# Patient Record
Sex: MALE | Race: WHITE | NOT HISPANIC OR LATINO | Employment: FULL TIME | URBAN - METROPOLITAN AREA
[De-identification: names, ages, dates, MRNs, and addresses within clinical notes are randomized per-mention and may not be internally consistent; named-entity substitution may affect disease eponyms.]

---

## 2021-06-08 PROBLEM — S46.012A TRAUMATIC COMPLETE TEAR OF LEFT ROTATOR CUFF: Status: ACTIVE | Noted: 2021-06-08

## 2021-07-14 PROBLEM — M75.112 INCOMPLETE TEAR OF LEFT ROTATOR CUFF: Status: ACTIVE | Noted: 2021-07-14

## 2021-07-29 PROBLEM — M75.112 INCOMPLETE TEAR OF LEFT ROTATOR CUFF: Status: RESOLVED | Noted: 2021-07-14 | Resolved: 2021-07-29

## 2024-04-25 PROBLEM — Z98.1 HISTORY OF LUMBAR FUSION: Status: ACTIVE | Noted: 2024-04-25

## 2024-04-25 PROBLEM — M54.16 LUMBAR RADICULOPATHY: Status: ACTIVE | Noted: 2024-04-25

## 2024-04-25 PROBLEM — F41.9 ANXIETY: Status: ACTIVE | Noted: 2024-04-25

## 2024-04-25 PROBLEM — M96.0 PSEUDARTHROSIS FOLLOWING SPINAL FUSION: Status: ACTIVE | Noted: 2024-04-25

## 2024-11-22 DIAGNOSIS — R31.21 ASYMPTOMATIC MICROSCOPIC HEMATURIA: Primary | ICD-10-CM

## 2024-12-03 ENCOUNTER — OFFICE VISIT (OUTPATIENT)
Dept: UROLOGY | Facility: CLINIC | Age: 54
End: 2024-12-03
Payer: MEDICAID

## 2024-12-03 VITALS — WEIGHT: 207.38 LBS | BODY MASS INDEX: 26.62 KG/M2 | HEIGHT: 74 IN

## 2024-12-03 DIAGNOSIS — R31.21 ASYMPTOMATIC MICROSCOPIC HEMATURIA: ICD-10-CM

## 2024-12-03 LAB
BILIRUBIN, UA POC OHS: NEGATIVE
BLOOD, UA POC OHS: NEGATIVE
CLARITY, UA POC OHS: CLEAR
COLOR, UA POC OHS: YELLOW
GLUCOSE, UA POC OHS: NEGATIVE
KETONES, UA POC OHS: NEGATIVE
LEUKOCYTES, UA POC OHS: NEGATIVE
NITRITE, UA POC OHS: NEGATIVE
PH, UA POC OHS: 6
PROTEIN, UA POC OHS: NEGATIVE
SPECIFIC GRAVITY, UA POC OHS: 1.01
UROBILINOGEN, UA POC OHS: 0.2

## 2024-12-03 PROCEDURE — 99204 OFFICE O/P NEW MOD 45 MIN: CPT | Mod: S$PBB,,, | Performed by: NURSE PRACTITIONER

## 2024-12-03 PROCEDURE — 3008F BODY MASS INDEX DOCD: CPT | Mod: CPTII,,, | Performed by: NURSE PRACTITIONER

## 2024-12-03 PROCEDURE — 1160F RVW MEDS BY RX/DR IN RCRD: CPT | Mod: CPTII,,, | Performed by: NURSE PRACTITIONER

## 2024-12-03 PROCEDURE — 1159F MED LIST DOCD IN RCRD: CPT | Mod: CPTII,,, | Performed by: NURSE PRACTITIONER

## 2024-12-03 RX ORDER — PREGABALIN 100 MG/1
100 CAPSULE ORAL
COMMUNITY

## 2024-12-03 RX ORDER — PANTOPRAZOLE SODIUM 40 MG/1
40 TABLET, DELAYED RELEASE ORAL EVERY MORNING
COMMUNITY

## 2024-12-03 NOTE — PROGRESS NOTES
Subjective:       Patient ID: Villa Gastelum is a 54 y.o. male.    Chief Complaint: No chief complaint on file.      HPI: 54-year-old male, new to [facility], referred for blood in his urine.    Patient has had multiple urinalysis showing blood in the urine.  He denies seeing any blood in urine.  Denies any unexpected weight loss.  Denies any pain or burning urination.  Denies any odor to the urine.  Denies any fever or body aches.  Denies any unexpected weight loss.    Patient is disabled.  Patient is a former smoker.  He quit about 3 years ago.  He smoked for 35 years.  He smoked half a pack a day.    No other urinary complaints at this time.         Past Medical History:   Past Medical History:   Diagnosis Date    Anxiety disorder, unspecified     Rheumatoid arthritis, unspecified     Thrombosed hemorrhoids        Past Surgical Historical:   Past Surgical History:   Procedure Laterality Date    ANKLE FRACTURE SURGERY  2001    LUMBAR LAMINECTOMY      SHOULDER ARTHROSCOPY W/ ROTATOR CUFF REPAIR Left 07/28/2021    Dr. Flynn    SPINAL FUSION  09/06/2023    spinal fusion revision  06/12/2024        Medications:   Medication List with Changes/Refills   Current Medications    ACETAMINOPHEN (TYLENOL) 500 MG TABLET    Take 500 mg by mouth every 6 (six) hours as needed for Pain.    CYCLOBENZAPRINE (FEXMID) 7.5 MG TAB    Take 1 tablet (7.5 mg total) by mouth 3 (three) times daily as needed (muscle spasms).    HYDROCODONE-ACETAMINOPHEN (NORCO)  MG PER TABLET    Take 1 tablet by mouth every 6 (six) hours as needed for Pain.    IBUPROFEN (ADVIL,MOTRIN) 800 MG TABLET    Take 800 mg by mouth 3 (three) times daily.    PANTOPRAZOLE (PROTONIX) 40 MG TABLET    Take 40 mg by mouth every morning.    PREGABALIN (LYRICA) 100 MG CAPSULE    Take 100 mg by mouth.    PREGABALIN (LYRICA) 75 MG CAPSULE    Take 1 capsule (75 mg total) by mouth 2 (two) times daily. for 30 doses        Past Social History:   Social History      Socioeconomic History    Marital status:    Tobacco Use    Smoking status: Former     Types: Cigarettes    Smokeless tobacco: Never   Substance and Sexual Activity    Alcohol use: Yes    Drug use: Never       Allergies:   Review of patient's allergies indicates:   Allergen Reactions    Oxycodone-acetaminophen      Severe headache        Family History: No family history on file.     Review of Systems:  Review of Systems   Constitutional:  Negative for activity change and appetite change.   HENT:  Negative for congestion and dental problem.    Eyes:  Negative for visual disturbance.   Respiratory:  Negative for chest tightness and shortness of breath.    Cardiovascular:  Negative for chest pain.   Gastrointestinal:  Negative for abdominal distention and abdominal pain.   Genitourinary:  Positive for hematuria. Negative for decreased urine volume, difficulty urinating, dysuria, enuresis, flank pain, frequency, genital sores, penile discharge, penile pain, penile swelling, scrotal swelling, testicular pain and urgency.   Musculoskeletal:  Negative for back pain and neck pain.   Skin:  Negative for color change.   Neurological:  Negative for dizziness.   Hematological:  Negative for adenopathy.   Psychiatric/Behavioral:  Negative for agitation, behavioral problems and confusion.        Physical Exam:  Physical Exam  Vitals and nursing note reviewed.   Constitutional:       Appearance: He is well-developed.   HENT:      Head: Normocephalic.   Eyes:      Pupils: Pupils are equal, round, and reactive to light.   Cardiovascular:      Rate and Rhythm: Normal rate and regular rhythm.      Heart sounds: Normal heart sounds.   Pulmonary:      Effort: Pulmonary effort is normal.      Breath sounds: Normal breath sounds.   Abdominal:      General: Bowel sounds are normal.      Palpations: Abdomen is soft.   Musculoskeletal:         General: Normal range of motion.      Cervical back: Normal range of motion and neck  supple.   Skin:     General: Skin is warm and dry.   Neurological:      Mental Status: He is alert and oriented to person, place, and time.   Psychiatric:         Behavior: Behavior normal.       Urinalysis: Normal    Assessment/Plan:   Microscopic hematuria:  Urinalysis shows no blood today.  Denies any urinary complaints.    He was a former smoker.  Has a long history of smoking.  Discussed possible causes of blood in urine.  Discussed concerns of blood in his urine.  Schedule patient for CT urogram and cysto for further evaluation.    Follow-up to be arranged  Problem List Items Addressed This Visit    None  Visit Diagnoses       Asymptomatic microscopic hematuria        Relevant Orders    POCT Urinalysis(Instrument)    Cystoscopy    CT Urogram Abd Pelvis W WO
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
## 2024-12-09 ENCOUNTER — OFFICE VISIT (OUTPATIENT)
Dept: NEUROLOGY | Facility: CLINIC | Age: 54
End: 2024-12-09
Payer: MEDICAID

## 2024-12-09 DIAGNOSIS — F43.9 TRAUMA AND STRESSOR-RELATED DISORDER: ICD-10-CM

## 2024-12-09 DIAGNOSIS — F32.0 CURRENT MILD EPISODE OF MAJOR DEPRESSIVE DISORDER, UNSPECIFIED WHETHER RECURRENT: Primary | ICD-10-CM

## 2024-12-11 ENCOUNTER — TELEPHONE (OUTPATIENT)
Dept: UROLOGY | Facility: CLINIC | Age: 54
End: 2024-12-11
Payer: MEDICAID

## 2024-12-11 NOTE — TELEPHONE ENCOUNTER
Notified patient of results         ----- Message from Eugene Mendiola NP sent at 12/10/2024 12:42 PM CST -----  No renal masses noted.  Prostate is enlarged.  No other abnormalities.    Proceed with cysto.

## 2024-12-12 ENCOUNTER — PROCEDURE VISIT (OUTPATIENT)
Dept: UROLOGY | Facility: CLINIC | Age: 54
End: 2024-12-12
Payer: MEDICAID

## 2024-12-12 VITALS
OXYGEN SATURATION: 98 % | HEART RATE: 53 BPM | BODY MASS INDEX: 26.51 KG/M2 | DIASTOLIC BLOOD PRESSURE: 74 MMHG | SYSTOLIC BLOOD PRESSURE: 124 MMHG | WEIGHT: 206.5 LBS | RESPIRATION RATE: 14 BRPM

## 2024-12-12 DIAGNOSIS — R31.21 ASYMPTOMATIC MICROSCOPIC HEMATURIA: ICD-10-CM

## 2024-12-12 NOTE — PATIENT INSTRUCTIONS
Patient Education       Cystoscopy Discharge Instructions   About this topic   Your kidneys make urine. It is stored in your bladder. The urethra is a tube at the bottom of the bladder. Urine flows out of this tube. Sometimes, there is a blockage and urine is not able to leave the body.  A cystoscopy is a procedure that lets the doctor see the inside of your bladder and urethra. The doctor does it to:  Look for stones or tumors blocking the bladder and urethra  Look for changes or injury inside the bladder  Take a tissue sample from the inside of your bladder  Look for reasons for blood in the urine, pain with urination, or why you are passing urine often  Look for prostate problems     What care is needed at home?   Ask your doctor what you need to do when you go home. Make sure you ask questions if you do not understand what the doctor says. This way you will know what you need to do.  Take a warm bath or use a warm wet washcloth over the opening to the urethra. This will help to ease any pain. Do this as needed.  Drink 6 to 8 glasses of water a day and 3 to 4 glasses in the first few hours after the procedure to flush out your bladder and reduce irritation.  You may see some blood in your urine for a few days. This is normal.  Empty your bladder as soon as you feel the need to. Don't delay going to the bathroom. It stretches and weakens the bladder.  What follow-up care is needed?   Your doctor may ask you to make visits to the office to check on your progress. Be sure to keep these visits.  If you had a biopsy, talk with your doctor about the results.  What drugs may be needed?   The doctor may order drugs to:  Help with pain  Fight an infection  Help with bladder spasms  Will physical activity be limited?   Talk to your doctor about when you may go back to your normal activities like work, driving, or sex.  What problems could happen?   Bleeding  Infection  Injury to the bladder and urethra  Discomfort in the  urethra area  Burning sensation for a short time  Upset stomach  When do I need to call the doctor?   Signs of infection. These include a fever of 100.4°F (38°C) or higher, chills, pain with passing urine.  Pain that does not go away even with drugs or that lasts longer than 2 days  Too much blood in your urine  Passing large dime-sized clots  Cloudy urine  Little or no urine or not able to pass urine  Abdominal pain and nausea  Teach Back: Helping You Understand   The Teach Back Method helps you understand the information we are giving you. After you talk with the staff, tell them in your own words what you learned. This helps to make sure the staff has described each thing clearly. It also helps to explain things that may have been confusing. Before going home, make sure you can do these:  I can tell you about my procedure.  I can tell you what may help ease my pain.  I can tell you what I will do if I have a fever, chills, or am not able to pass urine.  Where can I learn more?   [employer]  https://www.cancer.org/treatment/understanding-your-diagnosis/tests/endoscopy/cystoscopy.html   [employer]  https://www.cancerresearchuk.org/about-cancer/bladder-cancer/getting-diagnosed/tests-diagnose/cystoscopy   [facility] Choices  http://www.nhs.uk/conditions/Cystoscopy/Pages/Introduction.aspx   Last Reviewed Date   2021-04-22  Consumer Information Use and Disclaimer   This information is not specific medical advice and does not replace information you receive from your health care provider. This is only a brief summary of general information. It does NOT include all information about conditions, illnesses, injuries, tests, procedures, treatments, therapies, discharge instructions or life-style choices that may apply to you. You must talk with your health care provider for complete information about your health and treatment options. This information should not be used to decide whether or not to accept your  health care providers advice, instructions or recommendations. Only your health care provider has the knowledge and training to provide advice that is right for you.  Copyright   Copyright © 2021 [employer] and its affiliates and/or licensors. All rights reserved.

## 2024-12-12 NOTE — PROCEDURES
Cystoscopy    Date/Time: 12/12/2024 1:00 PM    Performed by: Morgan Ray MD  Authorized by: Eugene Mendiola NP    Timeout: prior to procedure the correct patient, procedure, and site was verified    Prep: patient was prepped and draped in usual sterile fashion    Anesthesia:  Intraurethral instillation  Indications: hematuria    Position:  Supine  Anesthesia:  Intraurethral instillation  Patient sedated?: No    Preparation: Patient was prepped and draped in usual sterile fashion    Scope type:  Flexible cystoscope  External exam normal: Yes    Urethra normal: Yes    Prostate normal: Yes    Comments:      Patient was brought to the procedure room placed on the table padded prepped and draped in usual sterile fashion in supine position. The cystoscope was inserted into the urethra and advanced the urethra was normal prostate showed expected enlargement for patient's age, the bladder is entered and inspected the majority of the bladder was normal however there were 2 small papillary lesions at the bladder neck it was only seen on retroflexion Bilateral ureteral orifices were identified and noted to be normal in appearance with clear efflux of urine at this point the scope was removed the patient tolerated the procedure well there were no complications

## 2024-12-23 PROBLEM — F43.9 TRAUMA AND STRESSOR-RELATED DISORDER: Status: ACTIVE | Noted: 2024-12-23

## 2024-12-23 PROBLEM — F32.0 CURRENT MILD EPISODE OF MAJOR DEPRESSIVE DISORDER: Status: ACTIVE | Noted: 2024-12-23

## 2024-12-30 ENCOUNTER — CLINICAL SUPPORT (OUTPATIENT)
Dept: UROLOGY | Facility: CLINIC | Age: 54
End: 2024-12-30
Payer: MEDICAID

## 2024-12-30 DIAGNOSIS — N32.9 LESION OF BLADDER: Primary | ICD-10-CM

## 2024-12-30 RX ORDER — POLYETHYLENE GLYCOL 3350, SODIUM SULFATE ANHYDROUS, SODIUM BICARBONATE, SODIUM CHLORIDE, POTASSIUM CHLORIDE 236; 22.74; 6.74; 5.86; 2.97 G/4L; G/4L; G/4L; G/4L; G/4L
POWDER, FOR SOLUTION ORAL
COMMUNITY
Start: 2024-12-11

## 2024-12-30 RX ORDER — MULTIVITAMIN
1 TABLET ORAL DAILY
COMMUNITY

## 2024-12-30 RX ORDER — FAMOTIDINE 20 MG/1
20 TABLET, FILM COATED ORAL DAILY
COMMUNITY

## 2024-12-30 NOTE — PROGRESS NOTES
Medications updated as well as medical and surgical history.     Consents and education completed for TURBT at [facility] on 1/13/25 for bladder lesion.  Pt given highlighted written instructions after I verbally went over them with him, copy of faxed pre op order and medication list. Pt had several questions which were answered but provided clinic phone number with any concerns or questions, before or after procedure.

## 2025-01-08 LAB
ANION GAP SERPL CALC-SCNC: 10 MMOL/L (ref 3–11)
APPEARANCE, UA: CLEAR
BASOPHILS NFR BLD: 0.5 % (ref 0–3)
BILIRUB UR QL STRIP: NEGATIVE MG/DL
BUN SERPL-MCNC: 16 MG/DL (ref 7–18)
BUN/CREAT SERPL: 14.81 RATIO (ref 7–18)
CALCIUM SERPL-MCNC: 9.2 MG/DL (ref 8.8–10.5)
CHLORIDE SERPL-SCNC: 107 MMOL/L (ref 100–108)
CO2 SERPL-SCNC: 26 MMOL/L (ref 21–32)
COLOR UR: NORMAL
CREAT SERPL-MCNC: 1.08 MG/DL (ref 0.7–1.3)
EOSINOPHIL NFR BLD: 2 % (ref 1–3)
ERYTHROCYTE [DISTWIDTH] IN BLOOD BY AUTOMATED COUNT: 13.3 % (ref 12.5–18)
GFR ESTIMATION: 81
GLUCOSE (UA): NORMAL MG/DL
GLUCOSE SERPL-MCNC: 95 MG/DL (ref 70–110)
HCT VFR BLD AUTO: 43.9 % (ref 42–52)
HGB BLD-MCNC: 14.7 G/DL (ref 14–18)
HGB UR QL STRIP: NEGATIVE /UL
KETONES UR QL STRIP: NEGATIVE MG/DL
LEUKOCYTE ESTERASE UR QL STRIP: NEGATIVE /UL
LYMPHOCYTES NFR BLD: 27.8 % (ref 25–40)
MCH RBC QN AUTO: 30.8 PG (ref 27–31.2)
MCHC RBC AUTO-ENTMCNC: 33.5 G/DL (ref 31.8–35.4)
MCV RBC AUTO: 92 FL (ref 80–97)
MONOCYTES NFR BLD: 8.3 % (ref 1–15)
NEUTROPHILS # BLD AUTO: 4.91 10*3/UL (ref 1.8–7.7)
NEUTROPHILS NFR BLD: 60.9 % (ref 37–80)
NITRITE UR QL STRIP: NEGATIVE
NUCLEATED RED BLOOD CELLS: 0 %
PH UR STRIP: 5 PH (ref 5–8)
PLATELETS: 260 10*3/UL (ref 142–424)
POTASSIUM SERPL-SCNC: 4.1 MMOL/L (ref 3.6–5.2)
PROT UR QL STRIP: NEGATIVE MG/DL
RBC # BLD AUTO: 4.77 10*6/UL (ref 4.7–6.1)
SODIUM BLD-SCNC: 143 MMOL/L (ref 135–145)
SP GR UR STRIP: 1.01 (ref 1–1.03)
SPECIMEN COLLECTION METHOD, URINE: NORMAL
UROBILINOGEN UR STRIP-ACNC: NORMAL MG/DL
WBC # BLD: 8.1 10*3/UL (ref 4.6–10.2)

## 2025-01-13 ENCOUNTER — TELEPHONE (OUTPATIENT)
Dept: UROLOGY | Facility: CLINIC | Age: 55
End: 2025-01-13

## 2025-01-13 ENCOUNTER — OUTSIDE PLACE OF SERVICE (OUTPATIENT)
Dept: UROLOGY | Facility: CLINIC | Age: 55
End: 2025-01-13

## 2025-01-13 DIAGNOSIS — N32.9 LESION OF BLADDER: Primary | ICD-10-CM

## 2025-01-13 PROCEDURE — 52234 CYSTOSCOPY AND TREATMENT: CPT | Mod: ,,, | Performed by: UROLOGY

## 2025-01-13 RX ORDER — CIPROFLOXACIN 500 MG/1
500 TABLET ORAL 2 TIMES DAILY
Qty: 6 TABLET | Refills: 0 | Status: SHIPPED | OUTPATIENT
Start: 2025-01-13 | End: 2025-01-16

## 2025-01-13 RX ORDER — HYDROCODONE BITARTRATE AND ACETAMINOPHEN 10; 325 MG/1; MG/1
1 TABLET ORAL EVERY 6 HOURS PRN
Qty: 12 TABLET | Refills: 0 | Status: SHIPPED | OUTPATIENT
Start: 2025-01-13

## 2025-01-13 NOTE — TELEPHONE ENCOUNTER
"Contacted pt in regards to pain medication allergy. Pt states " I can't take percocet, oxycodone because it causes a severe headache, but I can take the hydrocodone-acetaminophen"   "

## 2025-01-23 ENCOUNTER — OFFICE VISIT (OUTPATIENT)
Dept: UROLOGY | Facility: CLINIC | Age: 55
End: 2025-01-23
Payer: MEDICAID

## 2025-01-23 VITALS
SYSTOLIC BLOOD PRESSURE: 135 MMHG | WEIGHT: 210 LBS | DIASTOLIC BLOOD PRESSURE: 79 MMHG | BODY MASS INDEX: 26.95 KG/M2 | HEIGHT: 74 IN | HEART RATE: 67 BPM

## 2025-01-23 DIAGNOSIS — N32.89 BLADDER MASS: Primary | ICD-10-CM

## 2025-01-23 PROCEDURE — 3078F DIAST BP <80 MM HG: CPT | Mod: CPTII,,, | Performed by: UROLOGY

## 2025-01-23 PROCEDURE — 1160F RVW MEDS BY RX/DR IN RCRD: CPT | Mod: CPTII,,, | Performed by: UROLOGY

## 2025-01-23 PROCEDURE — 1159F MED LIST DOCD IN RCRD: CPT | Mod: CPTII,,, | Performed by: UROLOGY

## 2025-01-23 PROCEDURE — 99024 POSTOP FOLLOW-UP VISIT: CPT | Mod: ,,, | Performed by: UROLOGY

## 2025-01-23 PROCEDURE — 3075F SYST BP GE 130 - 139MM HG: CPT | Mod: CPTII,,, | Performed by: UROLOGY

## 2025-01-23 RX ORDER — CYCLOBENZAPRINE HYDROCHLORIDE 7.5 MG/1
7.5 TABLET, FILM COATED ORAL 3 TIMES DAILY PRN
COMMUNITY
Start: 2025-01-09

## 2025-01-23 RX ORDER — PREGABALIN 150 MG/1
150 CAPSULE ORAL 3 TIMES DAILY PRN
COMMUNITY
Start: 2025-01-09 | End: 2026-01-09

## 2025-01-23 RX ORDER — ACETAMINOPHEN 500 MG
1000 TABLET ORAL EVERY 6 HOURS PRN
COMMUNITY

## 2025-01-23 RX ORDER — ESCITALOPRAM OXALATE 10 MG/1
1 TABLET ORAL EVERY MORNING
COMMUNITY
Start: 2025-01-09

## 2025-01-23 RX ORDER — IBUPROFEN 200 MG
600 TABLET ORAL EVERY 6 HOURS PRN
COMMUNITY

## 2025-01-23 NOTE — PROGRESS NOTES
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

## 2025-01-31 ENCOUNTER — TELEPHONE (OUTPATIENT)
Dept: UROLOGY | Facility: CLINIC | Age: 55
End: 2025-01-31
Payer: MEDICAID

## 2025-01-31 NOTE — TELEPHONE ENCOUNTER
Returned request for call back. Informed patient that at the time of his f/u appointment with Dr. Ray we only had the preliminary path report on the specimen that was obtained during his TURBT. However, Dr. Ray has been out of the office to review the final PATH report from the outside facility. Once Dr. Ray returns next week, we will have him review the results & we will give him a call at that time. Explained that an MD has to review the results before they can be released to the patient. At this time the plan that was discussed at his TURBT f/u appointment remains the same. Verbalized understanding.                   ----- Message from surespot sent at 1/31/2025 11:06 AM CST -----  Contact: SIL RAMOS [[id number]]  ..Type:  Patient Requesting Call    Who Called:SIL RAMOS [[id number]]  Does the patient know what this is regarding?:pt states he had a procedure and have been waiting two week for results and checking in for an update   Would the patient rather a call back or a response via [facility]? call  Best Call Back Number:.[phone] (home)     Additional Information:

## 2025-03-17 ENCOUNTER — TELEPHONE (OUTPATIENT)
Dept: UROLOGY | Facility: CLINIC | Age: 55
End: 2025-03-17
Payer: MEDICAID

## 2025-03-17 DIAGNOSIS — N32.9 LESION OF BLADDER: Primary | ICD-10-CM

## 2025-03-17 NOTE — TELEPHONE ENCOUNTER
Spoke with patient in regards to referral. Patient states that he did not know of any referral at this time. However, he is due for a 3 month surveillance cystoscopy in April. Order placed.             ----- Message from Yahaira sent at 3/17/2025  1:54 PM CDT -----  Contact: SIL RAMOS [[id number]]  ..Type:  Patient Requesting CallWho Called: SIL RAMOS [[id number]]What is the call regarding?: checking on status of referral Would the patient rather a call back or a response via [facility]?  [facility] Call Back Number: [phone] Information:   claim number: [id number]

## 2025-04-07 ENCOUNTER — DOCUMENTATION ONLY (OUTPATIENT)
Dept: UROLOGY | Facility: CLINIC | Age: 55
End: 2025-04-07
Payer: MEDICAID

## 2025-04-07 NOTE — PROGRESS NOTES
Received fax from [facility] requesting insurance information. Faxed to [phone] upon request. BALDO

## 2025-05-01 ENCOUNTER — PROCEDURE VISIT (OUTPATIENT)
Dept: UROLOGY | Facility: CLINIC | Age: 55
End: 2025-05-01
Payer: MEDICAID

## 2025-05-01 VITALS
HEIGHT: 74 IN | WEIGHT: 206 LBS | HEART RATE: 52 BPM | BODY MASS INDEX: 26.44 KG/M2 | SYSTOLIC BLOOD PRESSURE: 129 MMHG | DIASTOLIC BLOOD PRESSURE: 70 MMHG

## 2025-05-01 DIAGNOSIS — N32.9 LESION OF BLADDER: ICD-10-CM

## 2025-05-01 RX ORDER — TIZANIDINE 4 MG/1
4 TABLET ORAL EVERY 8 HOURS PRN
COMMUNITY

## 2025-05-01 NOTE — PROCEDURES
Cystoscopy    Date/Time: 5/1/2025 10:00 AM    Performed by: Morgan Ray MD  Authorized by: Morgan Ray MD    Timeout: prior to procedure the correct patient, procedure, and site was verified    Prep: patient was prepped and draped in usual sterile fashion    Anesthesia:  Intraurethral instillation  Indications: hematuria    Position:  Supine  Anesthesia:  Intraurethral instillation  Patient sedated?: No    Preparation: Patient was prepped and draped in usual sterile fashion    Scope type:  Flexible cystoscope  External exam normal: Yes    Urethra normal: Yes    Prostate normal: Yes    Comments:      Patient was brought to the procedure room placed on the table padded prepped and draped in usual sterile fashion in supine position. The cystoscope was inserted into the urethra and advanced the urethra was normal prostate showed expected enlargement for patient's age, the bladder is entered and inspected is found to be free of tumor stone or foreign body.  Bilateral ureteral orifices were identified and noted to be normal in appearance with clear efflux of urine at this point the scope was removed the patient tolerated the procedure well there were no complications    Impression:   No bladder tumors noted return to clinic in 6 months for surveillance cysto